# Patient Record
Sex: FEMALE | Race: WHITE | NOT HISPANIC OR LATINO | Employment: UNEMPLOYED | URBAN - METROPOLITAN AREA
[De-identification: names, ages, dates, MRNs, and addresses within clinical notes are randomized per-mention and may not be internally consistent; named-entity substitution may affect disease eponyms.]

---

## 2017-10-12 NOTE — PATIENT DISCUSSION
CATARACTS, OU- VISUALLY SIGNIFICANT. SCHEDULE SX OS THEN LATER IN OD IF VISUAL SYMPTOMS PERSIST.  UPDATED GLS RX GIVEN FOR PT TO FILL IN THE EVENT SHE DOES NOT PROCEED WITH SX.

## 2017-10-12 NOTE — PATIENT DISCUSSION
Surgery Drops: I have given the patient the option to chose whether they would like a prescribed regimen of drops or an all-in-one drop for use before and after cataract surgery. The patient understands and desires to consider his/her options.

## 2017-10-12 NOTE — PATIENT DISCUSSION
S/P LASIK OS -STABLE FOLLOW. D/W PATIENT OPT OF ORA DUE TO DECREASED PREDICTABILITY OF MEASUREMENTS.

## 2017-10-12 NOTE — PATIENT DISCUSSION
REFRACTIVE ERROR, OU - DISCUSSED OPTION OF CORRECTING AT THE TIME OF CATARACT SURGERY. PT INTERESTED IN RECREATING HER MONOVA- ADVISED HER THAT OVER TIME HER FOCAL POINT HAS CHANGED. EXPLAINED THAT IF WE TARGET MONOVA THE DIFFERENCE BTWN THE 2 EYES WILL BE LARGER THAN IT IS NOW AND SHE MAY NOT TOLERATE THAT. CONSULT DR. SANTIAGO FOR MONOVA TRIAL W/ CTL TO CONFIRM (TARGET -1.75/-2.00)

## 2017-10-12 NOTE — PATIENT DISCUSSION
The importance of treatment, before and after cataract surgery, including lid hygiene and the diligent use of antibiotics and anti-inflammatories has also been explained.

## 2017-10-12 NOTE — PATIENT DISCUSSION
Glasses Option Counseling: I have discussed the option of glasses versus cataract surgery versus following. It was explained that when vision no longer meets the patient?s visual needs and a new prescription for glasses is not likely to satisfy the patient, the option of cataract surgery is a reasonable next step. It has been determined from manifest refraction and vision testing that a new prescription for glasses may help improve the visual symptoms somewhat but is not likely to improve all the patient?s visual symptoms. The patient was offered a new prescription for glasses. The patient has declined the option of glasses. It was explained that there is no guarantee that removing the cataract will improve their visual symptoms, however; it is believed that the cataract is contributing to the patient's visual impairment and surgery may significantly improve both the visual and functional status of the patient. The risks, benefits and alternatives of surgery were discussed with the patient. After this discussion, the patient desires to proceed with cataract surgery with implantation of an intraocular lens to improve vision to reduce glare, drive safely, read small print and work.

## 2017-10-25 NOTE — PATIENT DISCUSSION
Continue: prednisolone acetate (prednisolone acetate): drops,suspension: 1% 1 drop four times a day as directed into left eye 10-

## 2017-11-09 NOTE — PATIENT DISCUSSION
Surgery  Counseling: I have discussed the option of glasses versus cataract surgery versus following . It was explained that when vision no longer meets the patient?s visual needs and a new prescription for glasses is not likely to improve all of the patient?s visual symptoms, the option of cataract surgery is a reasonable next step. It was explained that there is no guarantee that removing the cataract will improve their visual symptoms, however; it is believed that the cataract is contributing to the patient's visual impairment and surgery may significantly improve both the visual and functional status of the patient. The risks, benefits and alternatives of surgery were discussed with the patient. After this discussion, the patient desires to proceed with cataract surgery with implantation of an intraocular lens to improve vision for night driving.

## 2017-11-09 NOTE — PATIENT DISCUSSION
***This patient had refractive cataract surgery performed. A monofocal IOL was placed to achieve a target refraction of -2.00 (which should provide them with satisfactory near vision). ***

## 2017-12-18 ENCOUNTER — ESTABLISHED COMPREHENSIVE EXAM (OUTPATIENT)
Dept: URBAN - METROPOLITAN AREA CLINIC 40 | Facility: CLINIC | Age: 57
End: 2017-12-18

## 2017-12-18 DIAGNOSIS — H52.03: ICD-10-CM

## 2017-12-18 DIAGNOSIS — H40.013: ICD-10-CM

## 2017-12-18 DIAGNOSIS — H52.4: ICD-10-CM

## 2017-12-18 PROCEDURE — 92015 DETERMINE REFRACTIVE STATE: CPT

## 2017-12-18 PROCEDURE — 92499OP2 OPTOMAP RETINAL SCREENING BOTH EYES

## 2017-12-18 PROCEDURE — 92014 COMPRE OPH EXAM EST PT 1/>: CPT

## 2017-12-18 ASSESSMENT — KERATOMETRY
OS_AXISANGLE_DEGREES: 180
OD_K2POWER_DIOPTERS: 43.75
OS_K2POWER_DIOPTERS: 43.75
OS_K1POWER_DIOPTERS: 43.50
OD_AXISANGLE2_DEGREES: 064
OS_AXISANGLE2_DEGREES: 090
OD_K1POWER_DIOPTERS: 43.25
OD_AXISANGLE_DEGREES: 154

## 2017-12-18 ASSESSMENT — VISUAL ACUITY
OD_CC: 20/20
OD_CC: J1
OU_CC: J1
OS_CC: J2
OS_PH: 20/40
OS_CC: 20/40
OU_CC: 20/20

## 2017-12-18 ASSESSMENT — TONOMETRY
OS_IOP_MMHG: 18
OD_IOP_MMHG: 18

## 2018-02-15 NOTE — PATIENT DISCUSSION
RESIDUAL REFRACTIVE ERROR POST PC IOL, OD - DISC OPT OF LASIK TO FINE TUNE VISUAL OUTCOME. PT UNDERSTANDS OPTION AND DESIRES TO PROCEED WITH LASIK  TO IMPROVE VA AND REDUCE DEPENDENCY ON GLS/CTLS.

## 2018-08-21 NOTE — PATIENT DISCUSSION
s/p lasik and cataract surgery for monovision.   patient happy but wants a pair of glasses for driving

## 2019-01-07 ENCOUNTER — ESTABLISHED COMPREHENSIVE EXAM (OUTPATIENT)
Dept: URBAN - METROPOLITAN AREA CLINIC 40 | Facility: CLINIC | Age: 59
End: 2019-01-07

## 2019-01-07 DIAGNOSIS — H40.013: ICD-10-CM

## 2019-01-07 PROCEDURE — 92014 COMPRE OPH EXAM EST PT 1/>: CPT

## 2019-01-07 PROCEDURE — 92015 DETERMINE REFRACTIVE STATE: CPT

## 2019-01-07 PROCEDURE — 92499OP2 OPTOMAP RETINAL SCREENING BOTH EYES

## 2019-01-07 ASSESSMENT — VISUAL ACUITY
OD_CC: 20/20
OD_CC: J1
OS_CC: J1
OU_CC: 20/15-1
OU_CC: J1
OS_CC: 20/20

## 2019-01-07 ASSESSMENT — KERATOMETRY
OD_K2POWER_DIOPTERS: 44
OS_K1POWER_DIOPTERS: 43.25
OD_K1POWER_DIOPTERS: 43.25
OD_K1POWER_DIOPTERS: 43.5
OD_AXISANGLE2_DEGREES: 064
OD_AXISANGLE_DEGREES: 152
OS_K1POWER_DIOPTERS: 43.50
OD_AXISANGLE2_DEGREES: 62
OS_AXISANGLE_DEGREES: 151
OS_AXISANGLE2_DEGREES: 090
OD_K2POWER_DIOPTERS: 43.75
OD_AXISANGLE_DEGREES: 154
OS_K2POWER_DIOPTERS: 43.75
OS_AXISANGLE2_DEGREES: 61
OS_AXISANGLE_DEGREES: 180

## 2019-01-07 ASSESSMENT — TONOMETRY
OS_IOP_MMHG: 19
OD_IOP_MMHG: 20

## 2022-11-10 ASSESSMENT — KERATOMETRY
OD_K1POWER_DIOPTERS: 43.5
OS_AXISANGLE2_DEGREES: 090
OS_K2POWER_DIOPTERS: 43.75
OD_K2POWER_DIOPTERS: 44
OD_K1POWER_DIOPTERS: 43.25
OS_K1POWER_DIOPTERS: 43.50
OD_AXISANGLE2_DEGREES: 62
OS_K1POWER_DIOPTERS: 43.25
OD_AXISANGLE_DEGREES: 152
OS_AXISANGLE_DEGREES: 151
OS_AXISANGLE_DEGREES: 180
OD_K2POWER_DIOPTERS: 43.75
OD_AXISANGLE_DEGREES: 154
OS_AXISANGLE2_DEGREES: 61
OD_AXISANGLE2_DEGREES: 064

## 2022-11-11 ENCOUNTER — COMPREHENSIVE EXAM (OUTPATIENT)
Dept: URBAN - METROPOLITAN AREA CLINIC 40 | Facility: CLINIC | Age: 62
End: 2022-11-11

## 2022-11-11 DIAGNOSIS — H52.4: ICD-10-CM

## 2022-11-11 DIAGNOSIS — H40.013: ICD-10-CM

## 2022-11-11 DIAGNOSIS — H25.813: ICD-10-CM

## 2022-11-11 DIAGNOSIS — H52.203: ICD-10-CM

## 2022-11-11 DIAGNOSIS — H52.03: ICD-10-CM

## 2022-11-11 PROCEDURE — 92499OP2 OPTOMAP RETINAL SCREENING BOTH EYES

## 2022-11-11 PROCEDURE — 92014 COMPRE OPH EXAM EST PT 1/>: CPT

## 2022-11-11 PROCEDURE — 92015 DETERMINE REFRACTIVE STATE: CPT

## 2022-11-11 ASSESSMENT — TONOMETRY
OS_IOP_MMHG: 20
OD_IOP_MMHG: 19

## 2022-11-11 ASSESSMENT — VISUAL ACUITY
OD_SC: 20/200
OD_SC: <J12
OS_SC: 20/400
OS_SC: <J12
OS_CC: J2
OS_CC: 20/30 "BLURRY"
OD_CC: J2